# Patient Record
Sex: FEMALE | Race: WHITE | Employment: OTHER | URBAN - METROPOLITAN AREA
[De-identification: names, ages, dates, MRNs, and addresses within clinical notes are randomized per-mention and may not be internally consistent; named-entity substitution may affect disease eponyms.]

---

## 2019-09-11 ENCOUNTER — APPOINTMENT (OUTPATIENT)
Dept: CT IMAGING | Age: 84
DRG: 372 | End: 2019-09-11
Attending: EMERGENCY MEDICINE
Payer: MEDICARE

## 2019-09-11 ENCOUNTER — HOSPITAL ENCOUNTER (INPATIENT)
Age: 84
LOS: 2 days | Discharge: HOME | DRG: 372 | End: 2019-09-13
Attending: EMERGENCY MEDICINE | Admitting: HOSPITALIST
Payer: MEDICARE

## 2019-09-11 DIAGNOSIS — R10.2 SUPRAPUBIC ABDOMINAL PAIN: ICD-10-CM

## 2019-09-11 DIAGNOSIS — R10.31 ABDOMINAL PAIN, RIGHT LOWER QUADRANT: Primary | ICD-10-CM

## 2019-09-11 PROBLEM — I10 ESSENTIAL HYPERTENSION: Status: ACTIVE | Noted: 2019-09-11

## 2019-09-11 PROBLEM — Z91.09 ENVIRONMENTAL ALLERGIES: Status: ACTIVE | Noted: 2019-09-11

## 2019-09-11 PROBLEM — K35.80 ACUTE APPENDICITIS: Status: ACTIVE | Noted: 2019-09-11

## 2019-09-11 PROBLEM — E87.1 HYPONATREMIA: Status: ACTIVE | Noted: 2019-09-11

## 2019-09-11 LAB
ALBUMIN SERPL-MCNC: 3 G/DL (ref 3.4–5)
ALBUMIN/GLOB SERPL: 0.8 {RATIO} (ref 0.8–1.7)
ALP SERPL-CCNC: 111 U/L (ref 45–117)
ALT SERPL-CCNC: 21 U/L (ref 13–56)
ANION GAP SERPL CALC-SCNC: 9 MMOL/L (ref 3–18)
APPEARANCE UR: CLEAR
AST SERPL-CCNC: 12 U/L (ref 10–38)
ATRIAL RATE: 79 BPM
BACTERIA URNS QL MICRO: NEGATIVE /HPF
BASOPHILS # BLD: 0 K/UL (ref 0–0.1)
BASOPHILS NFR BLD: 0 % (ref 0–2)
BILIRUB SERPL-MCNC: 1 MG/DL (ref 0.2–1)
BILIRUB UR QL: NEGATIVE
BUN SERPL-MCNC: 8 MG/DL (ref 7–18)
BUN/CREAT SERPL: 10 (ref 12–20)
CALCIUM SERPL-MCNC: 8.7 MG/DL (ref 8.5–10.1)
CALCULATED R AXIS, ECG10: -18 DEGREES
CALCULATED T AXIS, ECG11: 66 DEGREES
CHLORIDE SERPL-SCNC: 93 MMOL/L (ref 100–111)
CK MB CFR SERPL CALC: NORMAL % (ref 0–4)
CK MB SERPL-MCNC: <1 NG/ML (ref 5–25)
CK SERPL-CCNC: 38 U/L (ref 26–192)
CO2 SERPL-SCNC: 25 MMOL/L (ref 21–32)
COLOR UR: ABNORMAL
CREAT SERPL-MCNC: 0.82 MG/DL (ref 0.6–1.3)
DIAGNOSIS, 93000: NORMAL
DIFFERENTIAL METHOD BLD: ABNORMAL
EOSINOPHIL # BLD: 0 K/UL (ref 0–0.4)
EOSINOPHIL NFR BLD: 0 % (ref 0–5)
EPITH CASTS URNS QL MICRO: NORMAL /LPF (ref 0–5)
ERYTHROCYTE [DISTWIDTH] IN BLOOD BY AUTOMATED COUNT: 13 % (ref 11.6–14.5)
GLOBULIN SER CALC-MCNC: 3.7 G/DL (ref 2–4)
GLUCOSE SERPL-MCNC: 109 MG/DL (ref 74–99)
GLUCOSE UR STRIP.AUTO-MCNC: NEGATIVE MG/DL
HCT VFR BLD AUTO: 31.8 % (ref 35–45)
HGB BLD-MCNC: 11.1 G/DL (ref 12–16)
HGB UR QL STRIP: NEGATIVE
KETONES UR QL STRIP.AUTO: ABNORMAL MG/DL
LEUKOCYTE ESTERASE UR QL STRIP.AUTO: NEGATIVE
LIPASE SERPL-CCNC: 74 U/L (ref 73–393)
LYMPHOCYTES # BLD: 0.8 K/UL (ref 0.9–3.6)
LYMPHOCYTES NFR BLD: 10 % (ref 21–52)
MCH RBC QN AUTO: 31.9 PG (ref 24–34)
MCHC RBC AUTO-ENTMCNC: 34.9 G/DL (ref 31–37)
MCV RBC AUTO: 91.4 FL (ref 74–97)
MONOCYTES # BLD: 0.7 K/UL (ref 0.05–1.2)
MONOCYTES NFR BLD: 8 % (ref 3–10)
NEUTS SEG # BLD: 7.2 K/UL (ref 1.8–8)
NEUTS SEG NFR BLD: 82 % (ref 40–73)
NITRITE UR QL STRIP.AUTO: NEGATIVE
P-R INTERVAL, ECG05: 312 MS
PH UR STRIP: 6 [PH] (ref 5–8)
PLATELET # BLD AUTO: 192 K/UL (ref 135–420)
PMV BLD AUTO: 10.2 FL (ref 9.2–11.8)
POTASSIUM SERPL-SCNC: 3.6 MMOL/L (ref 3.5–5.5)
PROT SERPL-MCNC: 6.7 G/DL (ref 6.4–8.2)
PROT UR STRIP-MCNC: ABNORMAL MG/DL
Q-T INTERVAL, ECG07: 438 MS
QRS DURATION, ECG06: 170 MS
QTC CALCULATION (BEZET), ECG08: 502 MS
RBC # BLD AUTO: 3.48 M/UL (ref 4.2–5.3)
RBC #/AREA URNS HPF: 0 /HPF (ref 0–5)
SODIUM SERPL-SCNC: 127 MMOL/L (ref 136–145)
SP GR UR REFRACTOMETRY: 1.01 (ref 1–1.03)
TROPONIN I SERPL-MCNC: <0.02 NG/ML (ref 0–0.04)
UROBILINOGEN UR QL STRIP.AUTO: 2 EU/DL (ref 0.2–1)
VENTRICULAR RATE, ECG03: 79 BPM
WBC # BLD AUTO: 8.8 K/UL (ref 4.6–13.2)
WBC URNS QL MICRO: NORMAL /HPF (ref 0–4)

## 2019-09-11 PROCEDURE — 80053 COMPREHEN METABOLIC PANEL: CPT

## 2019-09-11 PROCEDURE — 83690 ASSAY OF LIPASE: CPT

## 2019-09-11 PROCEDURE — 81001 URINALYSIS AUTO W/SCOPE: CPT

## 2019-09-11 PROCEDURE — 99285 EMERGENCY DEPT VISIT HI MDM: CPT

## 2019-09-11 PROCEDURE — 93005 ELECTROCARDIOGRAM TRACING: CPT

## 2019-09-11 PROCEDURE — 85025 COMPLETE CBC W/AUTO DIFF WBC: CPT

## 2019-09-11 PROCEDURE — 82550 ASSAY OF CK (CPK): CPT

## 2019-09-11 PROCEDURE — 74176 CT ABD & PELVIS W/O CONTRAST: CPT

## 2019-09-11 PROCEDURE — 74011250636 HC RX REV CODE- 250/636: Performed by: HOSPITALIST

## 2019-09-11 PROCEDURE — 74011000258 HC RX REV CODE- 258: Performed by: HOSPITALIST

## 2019-09-11 PROCEDURE — 65270000029 HC RM PRIVATE

## 2019-09-11 RX ORDER — ACETAMINOPHEN 325 MG/1
650 TABLET ORAL
Status: DISCONTINUED | OUTPATIENT
Start: 2019-09-11 | End: 2019-09-13 | Stop reason: HOSPADM

## 2019-09-11 RX ORDER — SODIUM CHLORIDE 9 MG/ML
100 INJECTION, SOLUTION INTRAVENOUS CONTINUOUS
Status: DISCONTINUED | OUTPATIENT
Start: 2019-09-11 | End: 2019-09-13 | Stop reason: HOSPADM

## 2019-09-11 RX ORDER — HEPARIN SODIUM 5000 [USP'U]/ML
5000 INJECTION, SOLUTION INTRAVENOUS; SUBCUTANEOUS EVERY 8 HOURS
Status: DISCONTINUED | OUTPATIENT
Start: 2019-09-11 | End: 2019-09-13 | Stop reason: HOSPADM

## 2019-09-11 RX ORDER — MORPHINE SULFATE 2 MG/ML
2 INJECTION, SOLUTION INTRAMUSCULAR; INTRAVENOUS
Status: DISCONTINUED | OUTPATIENT
Start: 2019-09-11 | End: 2019-09-13 | Stop reason: HOSPADM

## 2019-09-11 RX ORDER — ONDANSETRON 2 MG/ML
4 INJECTION INTRAMUSCULAR; INTRAVENOUS
Status: DISCONTINUED | OUTPATIENT
Start: 2019-09-11 | End: 2019-09-13 | Stop reason: HOSPADM

## 2019-09-11 RX ORDER — CLINDAMYCIN PHOSPHATE 900 MG/50ML
900 INJECTION INTRAVENOUS EVERY 8 HOURS
Status: DISCONTINUED | OUTPATIENT
Start: 2019-09-11 | End: 2019-09-13 | Stop reason: HOSPADM

## 2019-09-11 RX ORDER — AMLODIPINE BESYLATE 2.5 MG/1
2.5 TABLET ORAL DAILY
COMMUNITY

## 2019-09-11 RX ADMIN — AZTREONAM 2 G: 2 INJECTION, POWDER, LYOPHILIZED, FOR SOLUTION INTRAMUSCULAR; INTRAVENOUS at 17:49

## 2019-09-11 RX ADMIN — SODIUM CHLORIDE 100 ML/HR: 900 INJECTION, SOLUTION INTRAVENOUS at 19:53

## 2019-09-11 RX ADMIN — HEPARIN SODIUM 5000 UNITS: 5000 INJECTION INTRAVENOUS; SUBCUTANEOUS at 21:11

## 2019-09-11 RX ADMIN — CLINDAMYCIN PHOSPHATE 900 MG: 900 INJECTION, SOLUTION INTRAVENOUS at 18:56

## 2019-09-11 RX ADMIN — AZTREONAM 2 G: 2 INJECTION, POWDER, LYOPHILIZED, FOR SOLUTION INTRAMUSCULAR; INTRAVENOUS at 22:26

## 2019-09-11 NOTE — ED TRIAGE NOTES
Alert female arrives to ED c/o ruq pain onset Sunday, pt reports some relief after BM, constant, cramping. 6/10 pain currently. +nausea no vomiting. Pt seen at [address] yesterday, had blood work done, no imaging.

## 2019-09-11 NOTE — ED NOTES
Patient presents to ED with C/O abd pain and dysuria that started Sunday. The pt stated she went to [facility] ED yesterday, but did not wait for the results due to a long wait. The pt denies chest pain, SOB, N/V/D, dizziness, and fever/chills. Patient is A&Ox4, side rails upx1, call bell w/in reach, and aware of plan of care. The patient is in NAD.  at the bedside.

## 2019-09-11 NOTE — ED NOTES
Patient resting comfortably, side rails up, call bell w/in reach, no further needs expressed at this time, aware of POC.  at the bedside.

## 2019-09-11 NOTE — ED NOTES
TRANSFER - OUT REPORT:    Verbal report given to CANDIDO Guo(name) on [facility]  being transferred to (unit) for routine progression of care       Report consisted of patients Situation, Background, Assessment and   Recommendations(SBAR). Information from the following report(s) SBAR, Kardex, ED Summary, Intake/Output, MAR and Recent Results was reviewed with the receiving nurse. Lines:   Peripheral IV 09/11/19 Right Antecubital (Active)   Site Assessment Clean, dry, & intact 9/11/2019  3:07 PM   Phlebitis Assessment 0 9/11/2019  3:07 PM   Infiltration Assessment 0 9/11/2019  3:07 PM   Dressing Type Tape;Transparent 9/11/2019  3:07 PM   Hub Color/Line Status Pink;Flushed 9/11/2019  3:07 PM   Action Taken Dressing reinforced;Blood drawn 9/11/2019  3:07 PM        Opportunity for questions and clarification was provided.       Patient transported with:   [employer]

## 2019-09-11 NOTE — ED NOTES
I performed a brief evaluation, including history and physical, of the patient here in triage and I have determined that pt will need further treatment and evaluation from the main side ER physician. I have placed initial orders to help in expediting patients care.      September 11, 2019 at 1:53 PM - Flores Bruno PA-C        Visit Vitals  /66   Pulse 90   Temp 98.3 °F (36.8 °C)   Resp 16   Ht 5' 3\" (1.6 m)   Wt 55.3 kg (122 lb)   SpO2 98%   BMI 21.61 kg/m²

## 2019-09-11 NOTE — H&P
History and Physical    Patient: Vidhi Bernstein               Sex: female          DOA: 9/11/2019       YOB: 1935      Age:  81 y.o.        LOS:  LOS: 0 days        CHIEF COMPLAINT: Abdominal Pain    Assessment/Plan:     Principal Problem:    RLQ abdominal pain (9/11/2019)    Active Problems:    Hyponatremia (9/11/2019)      Essential hypertension (9/11/2019)      Environmental allergies (9/11/2019)      Acute appendicitis (9/11/2019)      PLAN:  · RLQ abd pain, possible appendicitis per CT  · Dr. Matthew Gottlieb in Surgery consulted in ED, he will follow pt  · Start empiric IV abx to target bowel mireille, aztreonam and clindamycin (due to allergies)  · IV fluids  · NPO in case pt needs surgery  · Pain meds and anti-emetics prn  · HTN  · BP control  · Hyponatremia  · Suspect due to decreased PO intake over last 3-4 days  · IV fluids  · Monitor Na  · DVT prophylaxis    HPI:     Vidhi Bernstein is a 81 y.o. female who presented with abdominal pain. Pt said she began to experience RLQ crampy abdominal pain since 9/8. She thought she was constipated, and took prune juice and colace but pain did not improve. She went to [facility] ED yesterday but could not stand the wait so she went home. She presented today due to persistent pain. She has had decreased PO intake as a result of pain. She denies nausea or vomiting. She said she did have some diarrhea. She denies fevers or chills. She presented to the ED. Labs notable for hyponatremia. CT a/p shows finds concerning for acute appendicitis. Rosal[place]in [place] Dr. Matthew Gottlieb was consulted by ED who apparently did not think patient needed surgery and recommended medical admission. Pt was admitted and started on IV abx. Currently c/o 5/10 RLQ abd pain.       Past Medical History:   Diagnosis Date    CAD (coronary artery disease)     Pacemaker        Social History:  Social History     Socioeconomic History    Marital status: SINGLE     Spouse name: Not on file    Number of children: Not on file    Years of education: Not on file    Highest education level: Not on file   Tobacco Use    Smoking status: Never Smoker    Smokeless tobacco: Never Used   Substance and Sexual Activity    Alcohol use: Yes     Frequency: Never     Comment: occ    Drug use: Yes     Types: Marijuana       Family History:  History reviewed. No pertinent family history. Allergies: Allergies   Allergen Reactions    Pcn [Penicillins] Anaphylaxis    Levaquin [Levofloxacin] Unknown (comments)     \"arthritic pain'    Sulfa (Sulfonamide Antibiotics) Unknown (comments)       Home Medications:  Prior to Admission medications    Medication Sig Start Date End Date Taking? Authorizing Provider   amLODIPine (NORVASC) 2.5 mg tablet Take 2.5 mg by mouth daily. Yes Other, MD Falguni   montelukast sodium (SINGULAIR PO) Take  by mouth. Yes Other, MD Falguni         Review of Systems  Review of Systems   Constitutional: Negative for chills and fever. HENT: Negative for congestion and hearing loss. Eyes: Negative for blurred vision and double vision. Respiratory: Negative for cough and shortness of breath. Cardiovascular: Negative for chest pain and palpitations. Gastrointestinal: Positive for abdominal pain, constipation and diarrhea. Negative for nausea and vomiting. Genitourinary: Negative for dysuria and hematuria. Musculoskeletal: Negative for falls and myalgias. Skin: Negative for rash. Neurological: Negative for dizziness and focal weakness. Psychiatric/Behavioral: Negative. Objective:      Visit Vitals  /71   Pulse 89   Temp 98.3 °F (36.8 °C)   Resp 19   Ht 5' 3\" (1.6 m)   Wt 55.3 kg (122 lb)   SpO2 97%   BMI 21.61 kg/m²       Physical Exam:  Ears: hearing is intact  Eyes: Icterus is not present  Lungs: clear to auscultation bilaterally  Heart: regular rate and rhythm, S1, S2 normal, no murmur, click, rub or gallop  Gastrointestinal: soft, tenderness in RLQ.  No rebound or guarding. Bowel sounds normal. No masses,  no organomegaly  Neurological:  New Focal Deficits are not present  Psychiatric:  Mood is stable    Laboratory Studies:  CMP:   Lab Results   Component Value Date/Time     (L) 09/11/2019 03:00 PM    K 3.6 09/11/2019 03:00 PM    CL 93 (L) 09/11/2019 03:00 PM    CO2 25 09/11/2019 03:00 PM    AGAP 9 09/11/2019 03:00 PM     (H) 09/11/2019 03:00 PM    BUN 8 09/11/2019 03:00 PM    CREA 0.82 09/11/2019 03:00 PM    GFRAA >60 09/11/2019 03:00 PM    GFRNA >60 09/11/2019 03:00 PM    CA 8.7 09/11/2019 03:00 PM    ALB 3.0 (L) 09/11/2019 03:00 PM    TP 6.7 09/11/2019 03:00 PM    GLOB 3.7 09/11/2019 03:00 PM    AGRAT 0.8 09/11/2019 03:00 PM    SGOT 12 09/11/2019 03:00 PM    ALT 21 09/11/2019 03:00 PM     CBC:   Lab Results   Component Value Date/Time    WBC 8.8 09/11/2019 03:00 PM    HGB 11.1 (L) 09/11/2019 03:00 PM    HCT 31.8 (L) 09/11/2019 03:00 PM     09/11/2019 03:00 PM       Imaging:  CT ABD PELV WO CONT   Final Result   IMPRESSION:      1. Examination limited secondary to lack of oral and intravenous contrast.      2. Moderate to marked inflammatory changes in the pericecal region as well as   within the lower pelvic mesentery. There appears to be a dilated tubular   structure in the pericecal region thought to represent a dilated appendix,   concerning for acute appendicitis. Adjacent to the presumed dilated appendix   there is a rounded structure with central calcification, thought to represent   the right ovary, less likely a periappendiceal abscess. No extraluminal air. Additional wall thickening of the distal ileum (with sparing of the terminal   ileum) thought to be secondarily involved. 3. Moderate colonic diverticulosis without acute diverticulitis.

## 2019-09-11 NOTE — ED PROVIDER NOTES
[facility] EMERGENCY DEPT      3:07 PM    Date: 9/11/2019  Patient Name: Teresa Chandler    History of Presenting Illness     Chief Complaint   Patient presents with    Abdominal Pain       81 y.o. female with noted past medical history who presents to the emergency department with abdominal pain. The patient states that 3 days ago started noted that she was constipated for a few days and took her usual prune juice. Since then she has had some large bowel movements for the first day but has had a decrease since then secondary to not eating as much. She states 3 days ago she also started to have some right lower quadrant abdominal pain that persisted the present. She denies any UTI symptoms to me but states that she does have pain with urination her abdomen. She does deny any dysuria urgency or frequency. She denies fever chills. She states she had prior abdominal surgeries including a bladder tuck, hysterectomy, and cholecystectomy. She believes she also had an appendectomy as well but is unsure. Patient denies any other associated signs or symptoms. Patient denies any other complaints. Nursing notes regarding the HPI and triage nursing notes were reviewed. Prior medical records were reviewed. Past History     Past Medical History:  Past Medical History:   Diagnosis Date    CAD (coronary artery disease)     Pacemaker        Past Surgical History:  Past Surgical History:   Procedure Laterality Date    HX BLADDER SUSPENSION      HX CHOLECYSTECTOMY      HX HYSTERECTOMY      HX ORTHOPAEDIC         Family History:  History reviewed. No pertinent family history. Social History:  Social History     Tobacco Use    Smoking status: Never Smoker    Smokeless tobacco: Never Used   Substance Use Topics    Alcohol use: Yes     Frequency: Never     Comment: occ    Drug use: Yes     Types: Marijuana       Allergies:   Allergies   Allergen Reactions    Pcn [Penicillins] Anaphylaxis    Levaquin [Levofloxacin] Unknown (comments)     \"arthritic pain'    Sulfa (Sulfonamide Antibiotics) Unknown (comments)       Patient's primary care provider (as noted in EPIC):  Di Almodovar MD    Review of Systems   Constitutional: Negative for diaphoresis. HENT: Negative for congestion. Eyes: Negative for discharge. Respiratory: Negative for stridor. Cardiovascular: Negative for palpitations. Gastrointestinal: Negative for diarrhea. Endocrine: Negative for heat intolerance. Genitourinary: Negative for flank pain. Musculoskeletal: Negative for back pain. Neurological: Negative for weakness. Psychiatric/Behavioral: Negative for hallucinations. All other systems reviewed and are negative. Visit Vitals  /61   Pulse 73   Temp 98.3 °F (36.8 °C)   Resp 16   Ht 5' 3\" (1.6 m)   Wt 55.3 kg (122 lb)   SpO2 96%   BMI 21.61 kg/m²       Patient Vitals for the past 12 hrs:   Temp Pulse Resp BP SpO2   09/11/19 1615  73 16 132/61 96 %   09/11/19 1545  76 14 144/71 97 %   09/11/19 1507  83 12  97 %   09/11/19 1506    147/68    09/11/19 1352 98.3 °F (36.8 °C) 90 16 138/66 98 %       PHYSICAL EXAM:    CONSTITUTIONAL:  Alert, in no apparent distress;  well developed;  well nourished. HEAD:  Normocephalic, atraumatic. EYES:  EOMI. Non-icteric sclera. Normal conjunctiva. ENTM:  Nose:  no rhinorrhea. Throat:  no erythema or exudate, mucous membranes moist.  NECK:  No JVD. Supple  RESPIRATORY:  Chest clear, equal breath sounds, good air movement. CARDIOVASCULAR:  Regular rate and rhythm. No murmurs, rubs, or gallops. GI:  Normal bowel sounds, abdomen soft with right lower quadrant and lesser suprapubic tenderness to palpation. No rebound or guarding. BACK:  Non-tender. UPPER EXT:  Normal inspection. LOWER EXT:  No edema, no calf tenderness. Distal pulses intact.   NEURO:  Moves all four extremities, and grossly normal motor exam.  SKIN:  No rashes;  Normal for age. PSYCH:  Alert and normal affect. DIFFERENTIAL DIAGNOSES/ MEDICAL DECISION MAKING:  Gastritis, gerd, peptic ulcer disease, cholecystitis, pancreatitis, gastroenteritis, hepatitis, constipation related pain, appendicitis pain, diverticulitis, urinary tract infection, obstruction, abdominal wall pain, or combination of the above versus many other processes. Diagnostic Study Results     Abnormal lab results from this emergency department encounter:  Labs Reviewed   CBC WITH AUTOMATED DIFF - Abnormal; Notable for the following components:       Result Value    RBC 3.48 (*)     HGB 11.1 (*)     HCT 31.8 (*)     NEUTROPHILS 82 (*)     LYMPHOCYTES 10 (*)     ABS.  LYMPHOCYTES 0.8 (*)     All other components within normal limits   METABOLIC PANEL, COMPREHENSIVE - Abnormal; Notable for the following components:    Sodium 127 (*)     Chloride 93 (*)     Glucose 109 (*)     BUN/Creatinine ratio 10 (*)     Albumin 3.0 (*)     All other components within normal limits   URINALYSIS W/ RFLX MICROSCOPIC - Abnormal; Notable for the following components:    Protein TRACE (*)     Ketone TRACE (*)     Urobilinogen 2.0 (*)     All other components within normal limits   LIPASE   CARDIAC PANEL,(CK, CKMB & TROPONIN)   URINE MICROSCOPIC ONLY       Lab values for this patient within approximately the last 12 hours:  Recent Results (from the past 12 hour(s))   EKG, 12 LEAD, INITIAL    Collection Time: 09/11/19  2:00 PM   Result Value Ref Range    Ventricular Rate 79 BPM    Atrial Rate 79 BPM    P-R Interval 312 ms    QRS Duration 170 ms    Q-T Interval 438 ms    QTC Calculation (Bezet) 502 ms    Calculated R Axis -18 degrees    Calculated T Axis 66 degrees    Diagnosis       Electronic ventricular pacemaker  When compared with ECG of 14-MAY-2012 13:15,  Electronic ventricular pacemaker has replaced Sinus rhythm  Confirmed by Josh Gutiérrez ([id number]) on 9/11/2019 3:19:28 PM     CBC WITH AUTOMATED DIFF    Collection Time: 09/11/19 3:00 PM   Result Value Ref Range    WBC 8.8 4.6 - 13.2 K/uL    RBC 3.48 (L) 4.20 - 5.30 M/uL    HGB 11.1 (L) 12.0 - 16.0 g/dL    HCT 31.8 (L) 35.0 - 45.0 %    MCV 91.4 74.0 - 97.0 FL    MCH 31.9 24.0 - 34.0 PG    MCHC 34.9 31.0 - 37.0 g/dL    RDW 13.0 11.6 - 14.5 %    PLATELET 362 026 - 145 K/uL    MPV 10.2 9.2 - 11.8 FL    NEUTROPHILS 82 (H) 40 - 73 %    LYMPHOCYTES 10 (L) 21 - 52 %    MONOCYTES 8 3 - 10 %    EOSINOPHILS 0 0 - 5 %    BASOPHILS 0 0 - 2 %    ABS. NEUTROPHILS 7.2 1.8 - 8.0 K/UL    ABS. LYMPHOCYTES 0.8 (L) 0.9 - 3.6 K/UL    ABS. MONOCYTES 0.7 0.05 - 1.2 K/UL    ABS. EOSINOPHILS 0.0 0.0 - 0.4 K/UL    ABS. BASOPHILS 0.0 0.0 - 0.1 K/UL    DF AUTOMATED     LIPASE    Collection Time: 09/11/19  3:00 PM   Result Value Ref Range    Lipase 74 73 - 393 U/L   CARDIAC PANEL,(CK, CKMB & TROPONIN)    Collection Time: 09/11/19  3:00 PM   Result Value Ref Range    CK 38 26 - 192 U/L    CK - MB <1.0 <3.6 ng/ml    CK-MB Index  0.0 - 4.0 %     CALCULATION NOT PERFORMED WHEN RESULT IS BELOW LINEAR LIMIT    Troponin-I, QT <0.02 0.0 - 5.878 NG/ML   METABOLIC PANEL, COMPREHENSIVE    Collection Time: 09/11/19  3:00 PM   Result Value Ref Range    Sodium 127 (L) 136 - 145 mmol/L    Potassium 3.6 3.5 - 5.5 mmol/L    Chloride 93 (L) 100 - 111 mmol/L    CO2 25 21 - 32 mmol/L    Anion gap 9 3.0 - 18 mmol/L    Glucose 109 (H) 74 - 99 mg/dL    BUN 8 7.0 - 18 MG/DL    Creatinine 0.82 0.6 - 1.3 MG/DL    BUN/Creatinine ratio 10 (L) 12 - 20      GFR est AA >60 >60 ml/min/1.73m2    GFR est non-AA >60 >60 ml/min/1.73m2    Calcium 8.7 8.5 - 10.1 MG/DL    Bilirubin, total 1.0 0.2 - 1.0 MG/DL    ALT (SGPT) 21 13 - 56 U/L    AST (SGOT) 12 10 - 38 U/L    Alk.  phosphatase 111 45 - 117 U/L    Protein, total 6.7 6.4 - 8.2 g/dL    Albumin 3.0 (L) 3.4 - 5.0 g/dL    Globulin 3.7 2.0 - 4.0 g/dL    A-G Ratio 0.8 0.8 - 1.7     URINALYSIS W/ RFLX MICROSCOPIC    Collection Time: 09/11/19  3:29 PM   Result Value Ref Range    Color DARK YELLOW Appearance CLEAR      Specific gravity 1.013 1.005 - 1.030      pH (UA) 6.0 5.0 - 8.0      Protein TRACE (A) NEG mg/dL    Glucose NEGATIVE  NEG mg/dL    Ketone TRACE (A) NEG mg/dL    Bilirubin NEGATIVE  NEG      Blood NEGATIVE  NEG      Urobilinogen 2.0 (H) 0.2 - 1.0 EU/dL    Nitrites NEGATIVE  NEG      Leukocyte Esterase NEGATIVE  NEG     URINE MICROSCOPIC ONLY    Collection Time: 09/11/19  3:29 PM   Result Value Ref Range    WBC 0 to 2 0 - 4 /hpf    RBC 0 0 - 5 /hpf    Epithelial cells FEW 0 - 5 /lpf    Bacteria NEGATIVE  NEG /hpf       Radiologist and cardiologist interpretations if available at time of this note:  Ct Abd Pelv Wo Cont    Result Date: 9/11/2019  EXAM: CT of the abdomen and pelvis CLINICAL INDICATION/HISTORY: Abdominal pain, RLQ   > Additional: None. COMPARISON: None. > Reference Exam: None. TECHNIQUE: Axial CT imaging of the abdomen and pelvis was performed without intravenous contrast. Oral contrast not administered. Multiplanar reformats were generated. Dose reduction techniques used: automated exposure control, adjustment of the mAs and/or kVp according to patient size, and iterative reconstruction techniques. Digital Imaging and Communications in Medicine (DICOM) format image data are available to nonaffiliated external healthcare facilities or entities on a secure, media free, reciprocally searchable basis with patient authorization for at least a 12-month period after this study. _______________ FINDINGS: LOWER CHEST: Bibasilar opacities, atelectasis or scarring. No significant pleural effusion. Heart is mildly enlarged. LIVER, BILIARY: Liver is unremarkable. Mild intra and extra hepatic biliary ductal dilatation, likely reservoir effect. Cholecystectomy. PANCREAS: Unremarkable. SPLEEN: Unremarkable. ADRENALS: Mildly diffusely prominent adrenal glands, may represent hyperplasia. KIDNEYS/URETERS/BLADDER: No hydronephrosis. No calculi. LYMPH NODES: No enlarged lymph nodes. GASTROINTESTINAL TRACT: Moderate pericecal inflammatory changes with dilated tubular structure (measuring 11 mm in diameter thought to represent a dilated appendix. There is adjacent wall thickening involving the distal ileum with sparing of the terminal ileum, thought to be secondarily involved. Adjacent to the tip of the presumed dilated appendix there is a rounded structure (image 117) measuring 2.6 cm with central calcification. The right ovarian vein appears to extend inferiorly to this rounded structure. No extraluminal air. There is additional moderate to marked inflammatory changes within the lower mesentery. Moderate colonic diverticulosis without acute diverticulitis. PELVIC ORGANS: Hysterectomy. VASCULATURE: Mild atherosclerosis. BONES: No acute or aggressive osseous abnormalities identified. OTHER: No ascites. _______________     IMPRESSION: 1. Examination limited secondary to lack of oral and intravenous contrast. 2. Moderate to marked inflammatory changes in the pericecal region as well as within the lower pelvic mesentery. There appears to be a dilated tubular structure in the pericecal region thought to represent a dilated appendix, concerning for acute appendicitis. Adjacent to the presumed dilated appendix there is a rounded structure with central calcification, thought to represent the right ovary, less likely a periappendiceal abscess. No extraluminal air. Additional wall thickening of the distal ileum (with sparing of the terminal ileum) thought to be secondarily involved. 3. Moderate colonic diverticulosis without acute diverticulitis. Medication(s) ordered for patient during this emergency visit encounter:  Medications - No data to display    Medical Decision Making     I am the first provider for this patient. I reviewed the vital signs, available nursing notes, past medical history, past surgical history, family history and social history.     Vital Signs:  Reviewed the patient's vital signs. ED COURSE AND MEDICAL DECISION MAKING:    The patient did not want any medication for pain while in emergency department. On reassessment of the patient, the patient continues to have no surgical abdomen with no rebound nor guarding. The patient does not appear septic by presentation, vital signs and laboratory results. The patient continues to appear non-toxic in the emergency department on reevaluations. Consult Surgeon on Call    The on call surgery group/individual was called an with primarily right lower quadrant abdominal pain and she does have a rebound on my exam regarding this quadrant d the patient was presented for surgery consult. I personally spoke with Dr. Aarti Peng, in the surgery group, about the patient's presentation and management. I subsequently placed the noted surgeon on the treatment team.    5:16 PM  I spoke to Dr. Aarti Peng, the on-call surgeon, about the hospitalist needing a consult in the chart regarding medical management with antibiotics and that the patient does not need to or will not go to surgery at this time. Admit to Hospitalist    The patient was presented to the accepting hospitalist, Dr. Phyllis Perez. The patient's primary doctor is Candance Lander, MD, and admissions for this physician are with the hospitalist.  If the patient has no primary doctor, then admission is to the hospitalist as well. As the emergency physician, I wrote courtesy admission orders for the hospitalist physician. The courtesy orders included explicit instructions for the floor nursing staff to call the admitting attending physician upon patient arrival on the floor. IMPRESSION AND MEDICAL DECISION MAKING:  Based upon the patient's presentation with noted HPI and PE, along with the work   up done in the emergency department, I believe that the patient is having abdominal pain of uncertain etiology.       However, I do believe that the patient is stable and can be discharged home with further outpatient evaluation of the abdominal pain by the patient's primary doctor. 1. Abdominal pain. SPECIFIC PATIENT INSTRUCTIONS FROM THE PHYSICIAN WHO TREATED YOU IN THE ER TODAY:  1. Return if any concerns or worsening of condition(s)  2. IF prescribed, Norco for pain not controlled with over the counter ibuprofen. 3. Follow up with your primary doctor in the next 2-4 days for reevaluation. Patient is improved, resting quietly and comfortably. The patient will be discharged home. The patient was reassured that these symptoms do not appear to represent a serious or life threatening condition at this time. Warning signs of worsening condition were discussed and understood by the patient. Based on patient's age, coexisting illness, exam, and the results of this ED evaluation, the decision to treat as an outpatient was made. Based on the information available at time of discharge, acute pathology requiring immediate intervention was deemed relative unlikely. While it is impossible to completely exclude the possibility of underlying serious disease or worsening of condition, I feel the relative likelihood is extremely low. I discussed this uncertainty with the patient, who understood ED evaluation and treatment and felt comfortable with the outpatient treatment plan. All questions regarding care, test results, and follow up were answered. The patient is stable and appropriate to discharge. They understand that they should return to the emergency department for any new or worsening symptoms. I stressed the importance of follow up for repeat assessment and possibly further evaluation/treatment. Dictation disclaimer:  Please note that this dictation was completed with [employer], the [employer] voice recognition software.   Quite often unanticipated grammatical, syntax, homophones, and other interpretive errors are inadvertently transcribed by the computer software. Please disregard these errors. Please excuse any errors that have escaped final proofreading. Coding Diagnoses     Clinical Impression:   1. Abdominal pain, right lower quadrant    2. Suprapubic abdominal pain        Disposition     Disposition:  Admit. ANETTE Marcelino Chi Board Certified Emergency Physician    Provider Attestation:  If a scribe was utilized in generation of this patient record, I personally performed the services described in the documentation, reviewed the documentation, as recorded by the scribe in my presence, and it accurately records the patient's history of presenting illness, review of systems, patient physical examination, and procedures performed by me as the attending physician. ANETTE Marcelino Chi Board Certified Emergency Physician  9/11/2019.  4:22 PM

## 2019-09-12 LAB
ANION GAP SERPL CALC-SCNC: 10 MMOL/L (ref 3–18)
BASOPHILS # BLD: 0 K/UL (ref 0–0.1)
BASOPHILS NFR BLD: 0 % (ref 0–2)
BUN SERPL-MCNC: 7 MG/DL (ref 7–18)
BUN/CREAT SERPL: 11 (ref 12–20)
CALCIUM SERPL-MCNC: 8 MG/DL (ref 8.5–10.1)
CHLORIDE SERPL-SCNC: 99 MMOL/L (ref 100–111)
CO2 SERPL-SCNC: 22 MMOL/L (ref 21–32)
CREAT SERPL-MCNC: 0.65 MG/DL (ref 0.6–1.3)
DIFFERENTIAL METHOD BLD: ABNORMAL
EOSINOPHIL # BLD: 0.1 K/UL (ref 0–0.4)
EOSINOPHIL NFR BLD: 1 % (ref 0–5)
ERYTHROCYTE [DISTWIDTH] IN BLOOD BY AUTOMATED COUNT: 13.1 % (ref 11.6–14.5)
GLUCOSE SERPL-MCNC: 101 MG/DL (ref 74–99)
HCT VFR BLD AUTO: 28.8 % (ref 35–45)
HGB BLD-MCNC: 9.6 G/DL (ref 12–16)
LYMPHOCYTES # BLD: 0.9 K/UL (ref 0.9–3.6)
LYMPHOCYTES NFR BLD: 14 % (ref 21–52)
MAGNESIUM SERPL-MCNC: 1.8 MG/DL (ref 1.6–2.6)
MCH RBC QN AUTO: 31 PG (ref 24–34)
MCHC RBC AUTO-ENTMCNC: 33.3 G/DL (ref 31–37)
MCV RBC AUTO: 92.9 FL (ref 74–97)
MONOCYTES # BLD: 0.7 K/UL (ref 0.05–1.2)
MONOCYTES NFR BLD: 10 % (ref 3–10)
NEUTS SEG # BLD: 4.9 K/UL (ref 1.8–8)
NEUTS SEG NFR BLD: 75 % (ref 40–73)
PHOSPHATE SERPL-MCNC: 3.7 MG/DL (ref 2.5–4.9)
PLATELET # BLD AUTO: 186 K/UL (ref 135–420)
PMV BLD AUTO: 10 FL (ref 9.2–11.8)
POTASSIUM SERPL-SCNC: 3.4 MMOL/L (ref 3.5–5.5)
RBC # BLD AUTO: 3.1 M/UL (ref 4.2–5.3)
SODIUM SERPL-SCNC: 131 MMOL/L (ref 136–145)
WBC # BLD AUTO: 6.6 K/UL (ref 4.6–13.2)

## 2019-09-12 PROCEDURE — 83735 ASSAY OF MAGNESIUM: CPT

## 2019-09-12 PROCEDURE — 84100 ASSAY OF PHOSPHORUS: CPT

## 2019-09-12 PROCEDURE — 74011250636 HC RX REV CODE- 250/636: Performed by: HOSPITALIST

## 2019-09-12 PROCEDURE — 85025 COMPLETE CBC W/AUTO DIFF WBC: CPT

## 2019-09-12 PROCEDURE — 74011000258 HC RX REV CODE- 258: Performed by: HOSPITALIST

## 2019-09-12 PROCEDURE — 74011250637 HC RX REV CODE- 250/637: Performed by: HOSPITALIST

## 2019-09-12 PROCEDURE — 80048 BASIC METABOLIC PNL TOTAL CA: CPT

## 2019-09-12 PROCEDURE — 36415 COLL VENOUS BLD VENIPUNCTURE: CPT

## 2019-09-12 PROCEDURE — 65270000029 HC RM PRIVATE

## 2019-09-12 RX ORDER — NALOXONE HYDROCHLORIDE 0.4 MG/ML
0.4 INJECTION, SOLUTION INTRAMUSCULAR; INTRAVENOUS; SUBCUTANEOUS AS NEEDED
Status: DISCONTINUED | OUTPATIENT
Start: 2019-09-12 | End: 2019-09-13 | Stop reason: HOSPADM

## 2019-09-12 RX ORDER — POTASSIUM CHLORIDE 750 MG/1
20 TABLET, EXTENDED RELEASE ORAL
Status: DISCONTINUED | OUTPATIENT
Start: 2019-09-12 | End: 2019-09-12 | Stop reason: ALTCHOICE

## 2019-09-12 RX ORDER — POTASSIUM CHLORIDE 7.45 MG/ML
10 INJECTION INTRAVENOUS ONCE
Status: DISCONTINUED | OUTPATIENT
Start: 2019-09-12 | End: 2019-09-12 | Stop reason: ALTCHOICE

## 2019-09-12 RX ORDER — MAGNESIUM SULFATE 1 G/100ML
1 INJECTION INTRAVENOUS ONCE
Status: COMPLETED | OUTPATIENT
Start: 2019-09-12 | End: 2019-09-12

## 2019-09-12 RX ORDER — POTASSIUM CHLORIDE 7.45 MG/ML
10 INJECTION INTRAVENOUS
Status: DISPENSED | OUTPATIENT
Start: 2019-09-12 | End: 2019-09-12

## 2019-09-12 RX ORDER — POTASSIUM CHLORIDE 750 MG/1
20 TABLET, FILM COATED, EXTENDED RELEASE ORAL
Status: DISCONTINUED | OUTPATIENT
Start: 2019-09-12 | End: 2019-09-12

## 2019-09-12 RX ADMIN — SODIUM CHLORIDE 100 ML/HR: 900 INJECTION, SOLUTION INTRAVENOUS at 12:32

## 2019-09-12 RX ADMIN — AZTREONAM 2 G: 2 INJECTION, POWDER, LYOPHILIZED, FOR SOLUTION INTRAMUSCULAR; INTRAVENOUS at 22:34

## 2019-09-12 RX ADMIN — CLINDAMYCIN PHOSPHATE 900 MG: 900 INJECTION, SOLUTION INTRAVENOUS at 03:21

## 2019-09-12 RX ADMIN — AZTREONAM 2 G: 2 INJECTION, POWDER, LYOPHILIZED, FOR SOLUTION INTRAMUSCULAR; INTRAVENOUS at 06:04

## 2019-09-12 RX ADMIN — CLINDAMYCIN PHOSPHATE 900 MG: 900 INJECTION, SOLUTION INTRAVENOUS at 10:26

## 2019-09-12 RX ADMIN — CLINDAMYCIN PHOSPHATE 900 MG: 900 INJECTION, SOLUTION INTRAVENOUS at 16:41

## 2019-09-12 RX ADMIN — MAGNESIUM SULFATE HEPTAHYDRATE 1 G: 1 INJECTION, SOLUTION INTRAVENOUS at 10:27

## 2019-09-12 RX ADMIN — POTASSIUM CHLORIDE 20 MEQ: 750 TABLET, EXTENDED RELEASE ORAL at 12:34

## 2019-09-12 RX ADMIN — AZTREONAM 2 G: 2 INJECTION, POWDER, LYOPHILIZED, FOR SOLUTION INTRAMUSCULAR; INTRAVENOUS at 13:35

## 2019-09-12 RX ADMIN — POTASSIUM CHLORIDE 20 MEQ: 750 TABLET, EXTENDED RELEASE ORAL at 13:35

## 2019-09-12 RX ADMIN — POTASSIUM CHLORIDE 10 MEQ: 7.46 INJECTION, SOLUTION INTRAVENOUS at 09:00

## 2019-09-12 NOTE — PROGRESS NOTES
200 nurse modified pt diet to reflect per pt request no eggs, milk, or beef, no allergies just dislikes,,, ezio hutton

## 2019-09-12 NOTE — PROGRESS NOTES
Problem: Pain  Goal: *Control of Pain  Outcome: Progressing Towards Goal     Problem: Patient Education: Go to Patient Education Activity  Goal: Patient/Family Education  Outcome: Progressing Towards Goal     Problem: Pressure Injury - Risk of  Goal: *Prevention of pressure injury  Description  Document Emile Scale and appropriate interventions in the flowsheet. Outcome: Progressing Towards Goal  Note:   Pressure Injury Interventions: Activity Interventions: Increase time out of bed         Nutrition Interventions: Document food/fluid/supplement intake                     Problem: Patient Education: Go to Patient Education Activity  Goal: Patient/Family Education  Outcome: Progressing Towards Goal     Problem: Falls - Risk of  Goal: *Absence of Falls  Description  Document Franco Barrera Fall Risk and appropriate interventions in the flowsheet.   Outcome: Progressing Towards Goal  Note:   Fall Risk Interventions:            Medication Interventions: Teach patient to arise slowly                   Problem: Patient Education: Go to Patient Education Activity  Goal: Patient/Family Education  Outcome: Progressing Towards Goal

## 2019-09-12 NOTE — PROGRESS NOTES
Bedside shift change report given to Palomo Blackwell RN (oncoming nurse) by Amanda Foley (offgoing nurse). Report included the following information SBAR, Kardex, Intake/Output and MAR.

## 2019-09-12 NOTE — PROGRESS NOTES
conducted an initial consultation and Spiritual Assessment for Savi Estrella, who is a 81 y.o.,female. Patients Primary Language is: [place]. According to the patients EMR Scientologist Affiliation is: Yesenia Pettit  The reason the Patient came to the hospital is:   Patient Active Problem List    Diagnosis Date Noted    RLQ abdominal pain 09/11/2019    Hyponatremia 09/11/2019    Essential hypertension 09/11/2019    Environmental allergies 09/11/2019    Acute appendicitis 09/11/2019        The  provided the following Interventions:  Initiated a relationship of care and support with patient in room 2214 this morning. Listened empathically as patient talked about her being here and her being a [place] in belief. Patient related that she does not have an advance directive here on file nore has she ever done one but not interested in completing one today. Provided information about Spiritual Care Services. Offered prayer and assurance of continued prayers on patients behalf. The following outcomes were achieved:  Patient shared limited information about her medical narrative and spiritual journey/beliefs. Patient processed feeling about current hospitalization. Patient expressed gratitude for pastoral care visit. Assessment:  Patient does not have any Anglican/cultural needs that will affect patients preferences in health care. There are no further spiritual or Anglican issues which require Spiritual Care Services interventions at this time. Plan:  Chaplains will continue to follow and will provide pastoral care on an as needed/requested basis    . Fran Duke   Spiritual Care   [phone]

## 2019-09-12 NOTE — PROGRESS NOTES
Nutrition initial assessment/Plan of care      RECOMMENDATIONS:   1. Cardiac Diet (No dairy, eggs, pork or beef)  2. Monitor labs, weight and PO intake  3. RD to follow     GOALS:   1. PO intake meets >75% of protein/calorie needs by 9/17  2. Weight Maintenance (+/- 1-2 lb) by 9/19      ASSESSMENT:   Wt status is classified as normal per Body mass index is 21.61 kg/m². PO intake improving. Labs noted. Pt w/ hyponatremia and hypokalemia. Nutrition recommendations listed. RD to follow. Nutrition Diagnoses:   Inadequate PO intake related to decreased appetite d/t ABD pain as evidenced by recent diet recall from patient     Nutrition Risk:  [] High  [x] Moderate []  Low    SUBJECTIVE/OBJECTIVE:    Pt admitted for ABD pain. GS consulted and does not believe to be appendicitis; recommending GI consult, cont atbx and advance diet. Pt seen in room this afternoon. Denies having any food allergies (has intolerances to dairy, eggs, pork or beef) and no problems chewing/swallowing. Weight has been stable; recent  lb. Reports having a  good appetite normally consuming 3 meals per day at home. However since the ABD pain started her appetite has decreased. Provided a menu to implement preferences with dietary. Will continue to monitor. Information Obtained from:    [x] Chart Review   [x] Patient   [] Family/Caregiver   [] Nurse/Physician   [] Interdisciplinary Meeting/Rounds      Diet: Cardiac Diet  Medications: [x] Reviewed    Allergies: [x] Reviewed   Encounter Diagnoses     ICD-10-CM ICD-9-CM   1. Abdominal pain, right lower quadrant R10.31 789.03   2.  Suprapubic abdominal pain R10.2 789.09     Past Medical History:   Diagnosis Date    CAD (coronary artery disease)     Pacemaker       Labs:    Lab Results   Component Value Date/Time    Sodium 131 (L) 09/12/2019 04:00 AM    Potassium 3.4 (L) 09/12/2019 04:00 AM    Chloride 99 (L) 09/12/2019 04:00 AM    CO2 22 09/12/2019 04:00 AM    Anion gap 10 09/12/2019 04:00 AM    Glucose 101 (H) 09/12/2019 04:00 AM    BUN 7 09/12/2019 04:00 AM    Creatinine 0.65 09/12/2019 04:00 AM    Calcium 8.0 (L) 09/12/2019 04:00 AM    Magnesium 1.8 09/12/2019 04:00 AM    Phosphorus 3.7 09/12/2019 04:00 AM    Albumin 3.0 (L) 09/11/2019 03:00 PM     Anthropometrics: BMI (calculated): 21.6  Last 3 Recorded Weights in this Encounter    09/11/19 1352   Weight: 55.3 kg (122 lb)      Ht Readings from Last 1 Encounters:   09/11/19 5' 3\" (1.6 m)     Weight Metrics 9/11/2019   Weight 122 lb   BMI 21.61 kg/m2       No data found. IBW: 115 lb %IBW: 106% UBW: 122 lb   [] Weight Loss [] Weight Gain [x] Weight Stable    Estimated Nutrition Needs: [x] MSJ x 1.3 [x] Other: 25 kcal/kg  Calories: 4824-3246 kcal Based on:   [x] Actual BW    Protein:   60-67 g Based on:   [x] Actual BW    Fluid:       1500 ml Based on:   [x] Actual BW      [x] No Cultural, Sabianist or ethnic dietary need identified.     [] Cultural, Sabianist and ethnic food preferences identified and addressed     Wt Status:  [x] Normal (18.6 - 24.9) [] Underweight (<18.5) [] Overweight (25 - 29.9) [] Mild Obesity (30 - 34.9)  [] Moderate Obesity (35 - 39.9) [] Morbid Obesity (40+)       Nutrition Problems Identified:   [x] Suboptimal PO intake   [] Food Allergies  [] Difficulty chewing/swallowing/poor dentition  [] Constipation/Diarrhea   [] Nausea/Vomiting   [] None  [] Other:     Plan:   [x] Therapeutic Diet  [x]  Obtained/adjusted food preferences/tolerances and/or snacks options   []  Supplements added   [] Occupational therapy following for feeding techniques  []  HS snack added   []  Modify diet texture   []  Modify diet for food allergies   []  Educate patient   []  Assist with menu selection   [x]  Monitor PO intake on meal rounds   [x]  Continue inpatient monitoring and intervention   [x]  Participated in discharge planning/Interdisciplinary rounds/Team meetings   []  Other:     Education Needs:   [] Not appropriate for teaching at this time due to:   [x] Identified and addressed    Nutrition Monitoring and Evaluation:  [x] Continue ongoing monitoring and intervention  [] Nena Copeland

## 2019-09-12 NOTE — CONSULTS
Gastroenterology Consult    Patient: Lilibeth Manley MRN: [id number]  SSN: [id number]    YOB: 1935  Age: 81 y.o. Sex: female        Assessment:   1. Abdominal pain:  Mrs. Wilda Luna is an 81 yr old female with abdominal pain in right abdomen for a week and now associated diarrhea but no blood, fever or vomiting. CT noted inflammatory changes of the cecum with some appendiceal dilation. She reports having a negative colonoscopy in past few years. This is most likely either infectious process or ischemic. Unlikely malignancy or inflammatory bowel disease. She is currently refusing colonoscopy. She seems to be improving. I recommend continue supportive care. I recommend continue with antibiotics. Will recommend stool studies. Advance diet as tolerated to low residue diet. Plan:   1. Continue supportive care. 2. Continue antibiotics. 3. Stool studies. 4. Advance diet as tolerated. Subjective:      Lilibeth Manley is a 81 y.o. female who is being seen for abdominal pain. Mrs Wilda Luna has been having symptoms for the past week. She reports that she was initially have constipation. She had taken miralax and stool softeners. She then began to have diarrhea about half way through the week. She had loose to watery stools. She was also having abdominal pain. The pain was in the right abdomen with no radiation  The pain was dull and cramping with times of sharpness. She had no other abdominal pain. She had no pain prior to this week. She has no nausea or vomiting. No blood with stool and no melena. No weight loss. No fevers or chills. No heartburn or dysphagia. No recent travel or antibiotics. No sick contacts. She did report having lower blood pressure about 2-3 weeks ago and decreased her norvasc and eventually stopped it. She had no light headedness or syncope. She reports eating a few different items that had mold growing on it, so she was unsure of it safety.   She has not had similar symptoms in the past.  She reports having a colonoscopy by Dr. Samuel Loo in the past few years that she was told only had diverticulosis. She refuses to have any further colonoscopy. She had normal labs. She had CT scan with inflammatory changes in the area of the cecum, dilated appendix and diverticulosis. She has not had any further diarrhea and noted decrease in pain since admission. No other complaints. .    Past Medical History  HTN  CAD    Past Surgical History  CLAUDIA  Cholecystectomy  Colonoscopy  Bladder lift    Family History  No family history of any chronic GI illness    Social History  No alcohol or drug use.       Medications  Current Facility-Administered Medications   Medication Dose Route Frequency    potassium chloride SR (KLOR-CON 10) tablet 20 mEq  20 mEq Oral Q2H    aztreonam (AZACTAM) 2 g in 0.9% sodium chloride (MBP/ADV) 100 mL MBP  2 g IntraVENous Q8H    clindamycin (CLEOCIN) 900mg D5W 50mL IVPB (premix)  900 mg IntraVENous Q8H    acetaminophen (TYLENOL) tablet 650 mg  650 mg Oral Q4H PRN    heparin (porcine) injection 5,000 Units  5,000 Units SubCUTAneous Q8H    morphine injection 2 mg  2 mg IntraVENous Q4H PRN    ondansetron (ZOFRAN) injection 4 mg  4 mg IntraVENous Q4H PRN    0.9% sodium chloride infusion  100 mL/hr IntraVENous CONTINUOUS        Hospital Problems  Never Reviewed          Codes Class Noted POA    * (Principal) RLQ abdominal pain ICD-10-CM: R10.31  ICD-9-CM: 789.03  9/11/2019 Unknown        Hyponatremia ICD-10-CM: E87.1  ICD-9-CM: 276.1  9/11/2019 Unknown        Essential hypertension ICD-10-CM: I10  ICD-9-CM: 401.9  9/11/2019 Unknown        Environmental allergies ICD-10-CM: Z91.09  ICD-9-CM: V15.09  9/11/2019 Unknown        Acute appendicitis ICD-10-CM: K35.80  ICD-9-CM: 540.9  9/11/2019 Unknown            Allergies   Allergen Reactions    Pcn [Penicillins] Anaphylaxis    Levaquin [Levofloxacin] Unknown (comments)     \"arthritic pain'    Sulfa (Sulfonamide Antibiotics) Unknown (comments)       Review of Systems:  A comprehensive review of systems was negative except for that written in the History of Present Illness. Objective:     Physical Exam:  Visit Vitals  /67 (BP 1 Location: Right leg, BP Patient Position: At rest)   Pulse 75   Temp 97.4 °F (36.3 °C)   Resp 18   Ht 5' 3\" (1.6 m)   Wt 55.3 kg (122 lb)   SpO2 96%   BMI 21.61 kg/m²     General appearance: alert, cooperative, no distress, appears stated age  Lungs: clear to auscultation bilaterally  Heart: regular rate and rhythm, S1, S2 normal, no murmur, click, rub or gallop  Abdomen: soft, mild tenderness in right abdomen. Bowel sounds normal. No masses,  no organomegaly    Recent Results (from the past 24 hour(s))   EKG, 12 LEAD, INITIAL    Collection Time: 09/11/19  2:00 PM   Result Value Ref Range    Ventricular Rate 79 BPM    Atrial Rate 79 BPM    P-R Interval 312 ms    QRS Duration 170 ms    Q-T Interval 438 ms    QTC Calculation (Bezet) 502 ms    Calculated R Axis -18 degrees    Calculated T Axis 66 degrees    Diagnosis       Electronic ventricular pacemaker  When compared with ECG of 14-MAY-2012 13:15,  Electronic ventricular pacemaker has replaced Sinus rhythm  Confirmed by Jona Braswell ([id number]) on 9/11/2019 3:19:28 PM     CBC WITH AUTOMATED DIFF    Collection Time: 09/11/19  3:00 PM   Result Value Ref Range    WBC 8.8 4.6 - 13.2 K/uL    RBC 3.48 (L) 4.20 - 5.30 M/uL    HGB 11.1 (L) 12.0 - 16.0 g/dL    HCT 31.8 (L) 35.0 - 45.0 %    MCV 91.4 74.0 - 97.0 FL    MCH 31.9 24.0 - 34.0 PG    MCHC 34.9 31.0 - 37.0 g/dL    RDW 13.0 11.6 - 14.5 %    PLATELET 692 185 - 719 K/uL    MPV 10.2 9.2 - 11.8 FL    NEUTROPHILS 82 (H) 40 - 73 %    LYMPHOCYTES 10 (L) 21 - 52 %    MONOCYTES 8 3 - 10 %    EOSINOPHILS 0 0 - 5 %    BASOPHILS 0 0 - 2 %    ABS. NEUTROPHILS 7.2 1.8 - 8.0 K/UL    ABS. LYMPHOCYTES 0.8 (L) 0.9 - 3.6 K/UL    ABS. MONOCYTES 0.7 0.05 - 1.2 K/UL    ABS.  EOSINOPHILS 0.0 0.0 - 0.4 K/UL ABS. BASOPHILS 0.0 0.0 - 0.1 K/UL    DF AUTOMATED     LIPASE    Collection Time: 09/11/19  3:00 PM   Result Value Ref Range    Lipase 74 73 - 393 U/L   CARDIAC PANEL,(CK, CKMB & TROPONIN)    Collection Time: 09/11/19  3:00 PM   Result Value Ref Range    CK 38 26 - 192 U/L    CK - MB <1.0 <3.6 ng/ml    CK-MB Index  0.0 - 4.0 %     CALCULATION NOT PERFORMED WHEN RESULT IS BELOW LINEAR LIMIT    Troponin-I, QT <0.02 0.0 - 4.626 NG/ML   METABOLIC PANEL, COMPREHENSIVE    Collection Time: 09/11/19  3:00 PM   Result Value Ref Range    Sodium 127 (L) 136 - 145 mmol/L    Potassium 3.6 3.5 - 5.5 mmol/L    Chloride 93 (L) 100 - 111 mmol/L    CO2 25 21 - 32 mmol/L    Anion gap 9 3.0 - 18 mmol/L    Glucose 109 (H) 74 - 99 mg/dL    BUN 8 7.0 - 18 MG/DL    Creatinine 0.82 0.6 - 1.3 MG/DL    BUN/Creatinine ratio 10 (L) 12 - 20      GFR est AA >60 >60 ml/min/1.73m2    GFR est non-AA >60 >60 ml/min/1.73m2    Calcium 8.7 8.5 - 10.1 MG/DL    Bilirubin, total 1.0 0.2 - 1.0 MG/DL    ALT (SGPT) 21 13 - 56 U/L    AST (SGOT) 12 10 - 38 U/L    Alk.  phosphatase 111 45 - 117 U/L    Protein, total 6.7 6.4 - 8.2 g/dL    Albumin 3.0 (L) 3.4 - 5.0 g/dL    Globulin 3.7 2.0 - 4.0 g/dL    A-G Ratio 0.8 0.8 - 1.7     URINALYSIS W/ RFLX MICROSCOPIC    Collection Time: 09/11/19  3:29 PM   Result Value Ref Range    Color DARK YELLOW      Appearance CLEAR      Specific gravity 1.013 1.005 - 1.030      pH (UA) 6.0 5.0 - 8.0      Protein TRACE (A) NEG mg/dL    Glucose NEGATIVE  NEG mg/dL    Ketone TRACE (A) NEG mg/dL    Bilirubin NEGATIVE  NEG      Blood NEGATIVE  NEG      Urobilinogen 2.0 (H) 0.2 - 1.0 EU/dL    Nitrites NEGATIVE  NEG      Leukocyte Esterase NEGATIVE  NEG     URINE MICROSCOPIC ONLY    Collection Time: 09/11/19  3:29 PM   Result Value Ref Range    WBC 0 to 2 0 - 4 /hpf    RBC 0 0 - 5 /hpf    Epithelial cells FEW 0 - 5 /lpf    Bacteria NEGATIVE  NEG /hpf   METABOLIC PANEL, BASIC    Collection Time: 09/12/19  4:00 AM   Result Value Ref Range Sodium 131 (L) 136 - 145 mmol/L    Potassium 3.4 (L) 3.5 - 5.5 mmol/L    Chloride 99 (L) 100 - 111 mmol/L    CO2 22 21 - 32 mmol/L    Anion gap 10 3.0 - 18 mmol/L    Glucose 101 (H) 74 - 99 mg/dL    BUN 7 7.0 - 18 MG/DL    Creatinine 0.65 0.6 - 1.3 MG/DL    BUN/Creatinine ratio 11 (L) 12 - 20      GFR est AA >60 >60 ml/min/1.73m2    GFR est non-AA >60 >60 ml/min/1.73m2    Calcium 8.0 (L) 8.5 - 10.1 MG/DL   MAGNESIUM    Collection Time: 09/12/19  4:00 AM   Result Value Ref Range    Magnesium 1.8 1.6 - 2.6 mg/dL   PHOSPHORUS    Collection Time: 09/12/19  4:00 AM   Result Value Ref Range    Phosphorus 3.7 2.5 - 4.9 MG/DL   CBC WITH AUTOMATED DIFF    Collection Time: 09/12/19  4:00 AM   Result Value Ref Range    WBC 6.6 4.6 - 13.2 K/uL    RBC 3.10 (L) 4.20 - 5.30 M/uL    HGB 9.6 (L) 12.0 - 16.0 g/dL    HCT 28.8 (L) 35.0 - 45.0 %    MCV 92.9 74.0 - 97.0 FL    MCH 31.0 24.0 - 34.0 PG    MCHC 33.3 31.0 - 37.0 g/dL    RDW 13.1 11.6 - 14.5 %    PLATELET 560 661 - 473 K/uL    MPV 10.0 9.2 - 11.8 FL    NEUTROPHILS 75 (H) 40 - 73 %    LYMPHOCYTES 14 (L) 21 - 52 %    MONOCYTES 10 3 - 10 %    EOSINOPHILS 1 0 - 5 %    BASOPHILS 0 0 - 2 %    ABS. NEUTROPHILS 4.9 1.8 - 8.0 K/UL    ABS. LYMPHOCYTES 0.9 0.9 - 3.6 K/UL    ABS. MONOCYTES 0.7 0.05 - 1.2 K/UL    ABS. EOSINOPHILS 0.1 0.0 - 0.4 K/UL    ABS.  BASOPHILS 0.0 0.0 - 0.1 K/UL    DF AUTOMATED           Signed By: Donnie Jaffe MD     September 12, 2019

## 2019-09-12 NOTE — CONSULTS
See Dictated Consult    Inflammatory changes involving cecum/small bowel with dilated appendix with minimal associated inflammatory changes of unknown etiology - doubt acute appendicitis  Agree with current tx, would advance diet   Anticipate resolution of sx with medical tx

## 2019-09-12 NOTE — PROGRESS NOTES
Problem: Discharge Planning  Goal: *Discharge to safe environment  Outcome: Resolved/Met   Plan home    Reason for Admission:   abd pain                   RRAT Score:     8                Plan for utilizing home health:    no                      Current Advanced Directive/Advance Care Plan: none                         Transition of Care Plan:   Spoke with pt, lives with spouse. Independent with adls. amb with walker or cane, uses both. Lives in 2 story home. Spouse designated for dcp, transport home. Demographics correct. States has [facility] aarp supplement to [facility]. pcp Dr Tori Hammer. Plan home. Patient has designated _____spouse___________________ to participate in his/her discharge plan and to receive any needed information. Name: lane spaulding   Address:  Phone number: [phone]    Care Management Interventions  PCP Verified by CM: Yes  Palliative Care Criteria Met (RRAT>21 & CHF Dx)?: No  Mode of Transport at Discharge:  Other (see comment)  Transition of Care Consult (CM Consult): Discharge Planning  Discharge Durable Medical Equipment: No  Physical Therapy Consult: No  Occupational Therapy Consult: No  Speech Therapy Consult: No  Current Support Network: Lives with Spouse  Confirm Follow Up Transport: Family  Plan discussed with Pt/Family/Caregiver: Yes  Discharge Location  Discharge Placement: Home

## 2019-09-12 NOTE — CONSULTS
[address]    Name:  Santi Clifton  MR#:   [id number]  :  1935  ACCOUNT #:  [de-identified]  DATE OF SERVICE:  2019    REASON FOR CONSULTATION:  Right lower quadrant abdominal pain. HISTORY OF PRESENT ILLNESS:  This is an 43-year-old white female who has a history of chronic constipation who 3 days ago had the onset of constipation for which she utilized prune juice. The patient notes at the same time she had the onset of achy right lower quadrant abdominal pain. The prune juice induced diarrhea with resolution of her complaint of chronic constipation but with persistent right lower quadrant pain for which she presented to Emergency Department at [facility] on 09/10/2019 for evaluation and after evaluation was released. Because of persistent abdominal pain, she re-presented to [facility] Emergency Department on 2019 and diagnostic evaluation was pursued which included abdominopelvic CT scan noting the presence of inflammatory changes involving the pericecal area and wall thickening of the distal ileum as well as what appears to be dilated appendix 11 mm without definitive inflammatory changes. There is, however, adjacent to the tip of the presumed dilated appendix a rounded structure measuring 2.6 cm with central calcification. Surgical consultation is obtained for opinion in regard to management. The patient denies previous similar symptoms, fevers, chills, persistent urinary symptomatology. PAST MEDICAL HISTORY:  1. History of coronary artery disease, status post pacemaker placement. 2.  History of diverticulosis. PREADMISSION MEDICATIONS:  1. Amlodipine 2.5 mg daily. 2.  Singulair in the past, not currently prescribed. ALLERGIES:  PENICILLIN, LEVAQUIN, SULFA. PAST SURGICAL HISTORY:  1.   Vaginal hysterectomy, .  2.  Open reduction and internal fixation of left ankle fracture in .  3.  Bladder suspension in 2013.  4.  Laparoscopic cholecystectomy in . SOCIAL HISTORY:  The patient denies utilization of both tobacco and alcohol. FAMILY HISTORY:  Father  in the 46s of hepatitis. Mother  at 80 of thyroid cancer. She has no siblings. REVIEW OF SYSTEMS:  GENERAL:  The patient denies recent changes in her sleep or dietary patterns except as noted. HEENT:  Denies changes in auditory or visual acuity, recurrent pharyngitis, chronic rhinorrhea, epistaxis. RESPIRATORY:  Denies increased shortness of breath, productive cough, hemoptysis. CARDIOVASCULAR:  He has history of coronary artery disease, status post pacemaker placement. Denies hypertension, heart murmur, chest pain. GASTROINTESTINAL:  As noted in the history of chief complaint. GENITOURINARY:  Denies frequency, urgency, hesitancy, or dysuria. MUSCULOSKELETAL:  Has a history of a fractured left ankle status post ORIF and diffuse arthralgias secondary to osteoarthritis. NEUROLOGIC:  Denies history of stroke, paralysis, and paresthesias. PHYSICAL EXAMINATION:  GENERAL:  The patient is an alert white female, in no acute distress. She is oriented x3, conversant, nontoxic in appearance. VITAL SIGNS:  The patient is 5 feet 3 inches in height, weighing 122 pounds. Temperature is 97.3, pulse 73, respiratory rate 18, blood pressure is 119/69, room air oxygen saturation is 97%. HEENT:  Unremarkable. LUNGS:  Clear to auscultation without adventitious sounds with equal and symmetric excursions bilaterally. CARDIAC:  Reveals a regular rate and rhythm without rub, gallop, or murmur. SPINE:  Reveals normal kyphoscoliotic curve without CVA tenderness. ABDOMEN:  Minimally protuberant with normoactive bowel sounds and no masses or megaly. The patient's abdomen is extremely soft with the exception of minimal, very deep tenderness in the right lower quadrant without peritoneal signs. BREASTS:  Not performed. PELVIC:  Not performed.   RECTAL: Not performed. EXTREMITIES:  Have full range of motion without deformity. Pulses are 4/4 and symmetric in dorsalis pedis, posterior tibial, femoral, radial, brachial, and quad distributions. NEUROLOGIC:  Cranial nerves II through XII are intact. Motor is 5/5 in all major muscle groups of the upper and lower extremities in symmetric fashion. Sensory exam is intact to light touch, pinprick, pain, and proprioception. Deep tendon reflexes are +2/4 and symmetric in the upper and lower extremities. LABORATORY DATA:  Laboratory profile included a CBC on 09/12/2019 with hematocrit of 28.8, hemoglobin 9.6, white blood cell count 6.6 with a slight left shift with 75 segmented neutrophils. BMP obtained also on 09/12/2019 notes a sodium of 131, potassium of 3.4, chloride of 99, glucose of 101, calcium 8, otherwise normal laboratory parameters. Abdominal and pelvic CT scan is obtained at the time of admission. Notable findings of diverticulosis. In addition, the patient has moderate pericecal inflammatory changes as well as distal ileal inflammatory changes with thickening of the wall and splaying of the terminal ileum with a dilated tubular structure 11 mm in diameter thought to represent dilated appendix, and at the tip of the presumed dilated appendix was a rounded structure measuring 2.6 cm with central calcifications with the right ovarian vein extending inferiorly to the surrounding structure felt to represent the right ovary  ASSESSMENT:  1. Pericecal inflammatory changes as well as thickened ileum in conjunction with a dilated appendix as well as a calcified right ovary. The patient does not appear to have acute appendicitis and further evaluation of the inflammatory changes should be pursued at the radiology, is not clear at the present time. 2.  Coronary artery disease, status post pacemaker placement. 3.  Diverticulosis. 4.  Anemia with hematocrit of 29.     PLAN:  The patient has been admitted to the hospitalist service, currently is n.p.o., on IV antibiotics. I feel the patient could likely be advanced on her diet and be covered on oral antibiotic therapy. She should be followed with serial clinical and laboratory evaluation with anticipated improvement in her clinical presentation. Gastroenterologic consultation could certainly be entertained to assist in delineation of etiology of the radiologic changes. We will be happy to follow and appreciate the consult.       Arturo Escobar DO      [id number]  D:  09/12/2019 9:14  T:  09/12/2019 11:36  JOB #:  [id number]

## 2019-09-12 NOTE — PROGRESS NOTES
Progress Note      Patient: Alexandre Olvera               Sex: female          DOA: 9/11/2019       YOB: 1935      Age:  81 y.o.        LOS:  LOS: 1 day             CHIEF COMPLAINT:  Abdominal Pain      Assessment/Plan:     Principal Problem:    RLQ abdominal pain (9/11/2019)    Active Problems:    Hyponatremia (9/11/2019)      Essential hypertension (9/11/2019)      Environmental allergies (9/11/2019)      Acute appendicitis (9/11/2019)        PLAN:  · RLQ abd pain   ? D/w Dr. Vidhi Bell who does not believe pt has acute appendicitis but may have colitis or ileitis, recommending GI consult, cont atbx and advance diet. ? GI consulted. ? Cont IV abx to target bowel mireille, aztreonam and clindamycin (due to allergies)  ? IV fluids  ? Advance diet  ? Pain meds and anti-emetics prn  · HTN  ? BP control  · Hyponatremia  ? Suspect due to decreased PO intake over last 3-4 days  ? IV fluids  ? Monitor Na, improving  · Hypokalemia  ? Replace K  · DVT prophylaxis      Subjective:     Pt said her pain is about the same, slightly better. She said she wants to eat. Objective:     Visit Vitals  /78 (BP 1 Location: Left arm, BP Patient Position: At rest)   Pulse 76   Temp 97.9 °F (36.6 °C)   Resp 18   Ht 5' 3\" (1.6 m)   Wt 55.3 kg (122 lb)   SpO2 96%   BMI 21.61 kg/m²        Physical Exam:  Ears: hearing is intact  Eyes:  Icterus is not present  Lungs: clear to auscultation bilaterally  Heart: regular rate and rhythm, S1, S2 normal, no murmur, click, rub or gallop  Gastrointestinal: mod tenderness in RLQ, no r/g  Neurological:  New Focal Deficits are not present  Psychiatric:  Mood is stable    Lab/Data Reviewed:  CMP:   Lab Results   Component Value Date/Time     (L) 09/12/2019 04:00 AM    K 3.4 (L) 09/12/2019 04:00 AM    CL 99 (L) 09/12/2019 04:00 AM    CO2 22 09/12/2019 04:00 AM    AGAP 10 09/12/2019 04:00 AM     (H) 09/12/2019 04:00 AM    BUN 7 09/12/2019 04:00 AM    CREA 0.65 09/12/2019 04:00 AM    GFRAA >60 09/12/2019 04:00 AM    GFRNA >60 09/12/2019 04:00 AM    CA 8.0 (L) 09/12/2019 04:00 AM    MG 1.8 09/12/2019 04:00 AM    PHOS 3.7 09/12/2019 04:00 AM    ALB 3.0 (L) 09/11/2019 03:00 PM    TP 6.7 09/11/2019 03:00 PM    GLOB 3.7 09/11/2019 03:00 PM    AGRAT 0.8 09/11/2019 03:00 PM    SGOT 12 09/11/2019 03:00 PM    ALT 21 09/11/2019 03:00 PM     CBC:   Lab Results   Component Value Date/Time    WBC 6.6 09/12/2019 04:00 AM    HGB 9.6 (L) 09/12/2019 04:00 AM    HCT 28.8 (L) 09/12/2019 04:00 AM     09/12/2019 04:00 AM       Imaging Reviewed:  Ct Abd Pelv Wo Cont    Result Date: 9/11/2019  EXAM: CT of the abdomen and pelvis CLINICAL INDICATION/HISTORY: Abdominal pain, RLQ   > Additional: None. COMPARISON: None. > Reference Exam: None. TECHNIQUE: Axial CT imaging of the abdomen and pelvis was performed without intravenous contrast. Oral contrast not administered. Multiplanar reformats were generated. Dose reduction techniques used: automated exposure control, adjustment of the mAs and/or kVp according to patient size, and iterative reconstruction techniques. Digital Imaging and Communications in Medicine (DICOM) format image data are available to nonaffiliated external healthcare facilities or entities on a secure, media free, reciprocally searchable basis with patient authorization for at least a 12-month period after this study. _______________ FINDINGS: LOWER CHEST: Bibasilar opacities, atelectasis or scarring. No significant pleural effusion. Heart is mildly enlarged. LIVER, BILIARY: Liver is unremarkable. Mild intra and extra hepatic biliary ductal dilatation, likely reservoir effect. Cholecystectomy. PANCREAS: Unremarkable. SPLEEN: Unremarkable. ADRENALS: Mildly diffusely prominent adrenal glands, may represent hyperplasia. KIDNEYS/URETERS/BLADDER: No hydronephrosis. No calculi. LYMPH NODES: No enlarged lymph nodes.  GASTROINTESTINAL TRACT: Moderate pericecal inflammatory changes with dilated tubular structure (measuring 11 mm in diameter thought to represent a dilated appendix. There is adjacent wall thickening involving the distal ileum with sparing of the terminal ileum, thought to be secondarily involved. Adjacent to the tip of the presumed dilated appendix there is a rounded structure (image 117) measuring 2.6 cm with central calcification. The right ovarian vein appears to extend inferiorly to this rounded structure. No extraluminal air. There is additional moderate to marked inflammatory changes within the lower mesentery. Moderate colonic diverticulosis without acute diverticulitis. PELVIC ORGANS: Hysterectomy. VASCULATURE: Mild atherosclerosis. BONES: No acute or aggressive osseous abnormalities identified. OTHER: No ascites. _______________     IMPRESSION: 1. Examination limited secondary to lack of oral and intravenous contrast. 2. Moderate to marked inflammatory changes in the pericecal region as well as within the lower pelvic mesentery. There appears to be a dilated tubular structure in the pericecal region thought to represent a dilated appendix, concerning for acute appendicitis. Adjacent to the presumed dilated appendix there is a rounded structure with central calcification, thought to represent the right ovary, less likely a periappendiceal abscess. No extraluminal air. Additional wall thickening of the distal ileum (with sparing of the terminal ileum) thought to be secondarily involved. 3. Moderate colonic diverticulosis without acute diverticulitis.

## 2019-09-13 VITALS
SYSTOLIC BLOOD PRESSURE: 155 MMHG | OXYGEN SATURATION: 97 % | DIASTOLIC BLOOD PRESSURE: 81 MMHG | HEIGHT: 63 IN | HEART RATE: 85 BPM | WEIGHT: 122 LBS | TEMPERATURE: 97.7 F | RESPIRATION RATE: 18 BRPM | BODY MASS INDEX: 21.62 KG/M2

## 2019-09-13 LAB
ANION GAP SERPL CALC-SCNC: 11 MMOL/L (ref 3–18)
BASOPHILS # BLD: 0 K/UL (ref 0–0.1)
BASOPHILS NFR BLD: 0 % (ref 0–2)
BUN SERPL-MCNC: 13 MG/DL (ref 7–18)
BUN/CREAT SERPL: 20 (ref 12–20)
CALCIUM SERPL-MCNC: 8 MG/DL (ref 8.5–10.1)
CHLORIDE SERPL-SCNC: 103 MMOL/L (ref 100–111)
CO2 SERPL-SCNC: 21 MMOL/L (ref 21–32)
CREAT SERPL-MCNC: 0.64 MG/DL (ref 0.6–1.3)
DIFFERENTIAL METHOD BLD: ABNORMAL
EOSINOPHIL # BLD: 0.1 K/UL (ref 0–0.4)
EOSINOPHIL NFR BLD: 1 % (ref 0–5)
ERYTHROCYTE [DISTWIDTH] IN BLOOD BY AUTOMATED COUNT: 13.4 % (ref 11.6–14.5)
GLUCOSE SERPL-MCNC: 98 MG/DL (ref 74–99)
HCT VFR BLD AUTO: 28.5 % (ref 35–45)
HGB BLD-MCNC: 9.9 G/DL (ref 12–16)
LYMPHOCYTES # BLD: 0.9 K/UL (ref 0.9–3.6)
LYMPHOCYTES NFR BLD: 14 % (ref 21–52)
MAGNESIUM SERPL-MCNC: 1.9 MG/DL (ref 1.6–2.6)
MCH RBC QN AUTO: 31.7 PG (ref 24–34)
MCHC RBC AUTO-ENTMCNC: 34.7 G/DL (ref 31–37)
MCV RBC AUTO: 91.3 FL (ref 74–97)
MONOCYTES # BLD: 0.6 K/UL (ref 0.05–1.2)
MONOCYTES NFR BLD: 10 % (ref 3–10)
NEUTS SEG # BLD: 4.6 K/UL (ref 1.8–8)
NEUTS SEG NFR BLD: 75 % (ref 40–73)
PHOSPHATE SERPL-MCNC: 2.5 MG/DL (ref 2.5–4.9)
PLATELET # BLD AUTO: 228 K/UL (ref 135–420)
PMV BLD AUTO: 10.1 FL (ref 9.2–11.8)
POTASSIUM SERPL-SCNC: 4.3 MMOL/L (ref 3.5–5.5)
RBC # BLD AUTO: 3.12 M/UL (ref 4.2–5.3)
SODIUM SERPL-SCNC: 135 MMOL/L (ref 136–145)
WBC # BLD AUTO: 6.2 K/UL (ref 4.6–13.2)

## 2019-09-13 PROCEDURE — 36415 COLL VENOUS BLD VENIPUNCTURE: CPT

## 2019-09-13 PROCEDURE — 84100 ASSAY OF PHOSPHORUS: CPT

## 2019-09-13 PROCEDURE — 80048 BASIC METABOLIC PNL TOTAL CA: CPT

## 2019-09-13 PROCEDURE — 83735 ASSAY OF MAGNESIUM: CPT

## 2019-09-13 PROCEDURE — 85025 COMPLETE CBC W/AUTO DIFF WBC: CPT

## 2019-09-13 PROCEDURE — 74011000258 HC RX REV CODE- 258: Performed by: HOSPITALIST

## 2019-09-13 PROCEDURE — 74011250636 HC RX REV CODE- 250/636: Performed by: HOSPITALIST

## 2019-09-13 RX ORDER — MOXIFLOXACIN HYDROCHLORIDE 400 MG/1
400 TABLET ORAL DAILY
Qty: 5 TAB | Refills: 0 | Status: SHIPPED | OUTPATIENT
Start: 2019-09-13

## 2019-09-13 RX ADMIN — SODIUM CHLORIDE 100 ML/HR: 900 INJECTION, SOLUTION INTRAVENOUS at 01:54

## 2019-09-13 RX ADMIN — AZTREONAM 2 G: 2 INJECTION, POWDER, LYOPHILIZED, FOR SOLUTION INTRAMUSCULAR; INTRAVENOUS at 05:39

## 2019-09-13 RX ADMIN — CLINDAMYCIN PHOSPHATE 900 MG: 900 INJECTION, SOLUTION INTRAVENOUS at 01:50

## 2019-09-13 NOTE — PROGRESS NOTES
Problem: Discharge Planning  Goal: *Discharge to safe environment  Outcome: Resolved/Met   Plan home    Pt dc'd home, no services ordered. Care Management Interventions  PCP Verified by CM: Yes  Palliative Care Criteria Met (RRAT>21 & CHF Dx)?: No  Mode of Transport at Discharge:  Other (see comment)  Transition of Care Consult (CM Consult): Discharge Planning  Discharge Durable Medical Equipment: No  Physical Therapy Consult: No  Occupational Therapy Consult: No  Speech Therapy Consult: No  Current Support Network: Lives with Spouse  Confirm Follow Up Transport: Family  Plan discussed with Pt/Family/Caregiver: Yes  Discharge Location  Discharge Placement: Home

## 2019-09-13 NOTE — PROGRESS NOTES
Bedside shift change report given to [address] [place] [address] Avenue (oncoming nurse) by Jeremie Vuong (offgoing nurse). Report included the following information SBAR, Kardex, Intake/Output and MAR.

## 2019-09-13 NOTE — DISCHARGE INSTRUCTIONS

## 2019-09-13 NOTE — PROGRESS NOTES
Problem: Pain  Goal: *Control of Pain  Outcome: Progressing Towards Goal     Problem: Patient Education: Go to Patient Education Activity  Goal: Patient/Family Education  Outcome: Progressing Towards Goal     Problem: Pressure Injury - Risk of  Goal: *Prevention of pressure injury  Description  Document Emile Scale and appropriate interventions in the flowsheet. Outcome: Progressing Towards Goal  Note:   Pressure Injury Interventions: Activity Interventions: Increase time out of bed         Nutrition Interventions: Document food/fluid/supplement intake                     Problem: Patient Education: Go to Patient Education Activity  Goal: Patient/Family Education  Outcome: Progressing Towards Goal     Problem: Falls - Risk of  Goal: *Absence of Falls  Description  Document Denny Fells Fall Risk and appropriate interventions in the flowsheet.   Outcome: Progressing Towards Goal  Note:   Fall Risk Interventions:            Medication Interventions: Patient to call before getting OOB, Teach patient to arise slowly                   Problem: Patient Education: Go to Patient Education Activity  Goal: Patient/Family Education  Outcome: Progressing Towards Goal     Problem: Nutrition Deficit  Goal: *Optimize nutritional status  Outcome: Progressing Towards Goal

## 2019-09-13 NOTE — DISCHARGE SUMMARY
Discharge Summary    Patient: Ben Cruz               Sex: female          DOA: 9/11/2019       YOB: 1935      Age:  81 y.o.        LOS:  LOS: 2 days                Admit Date: 9/11/2019    Discharge Date: 9/13/2019    Admission Diagnoses: RLQ abdominal pain [R10.31]  RLQ abdominal pain [R10.31]  Acute appendicitis [K35.80]    Discharge Diagnoses:    Hospital Problems  Never Reviewed          Codes Class Noted POA    * (Principal) RLQ abdominal pain ICD-10-CM: R10.31  ICD-9-CM: 789.03  9/11/2019 Unknown        Hyponatremia ICD-10-CM: E87.1  ICD-9-CM: 276.1  9/11/2019 Unknown        Essential hypertension ICD-10-CM: I10  ICD-9-CM: 401.9  9/11/2019 Unknown        Environmental allergies ICD-10-CM: Z91.09  ICD-9-CM: V15.09  9/11/2019 Unknown              Discharge Disposition: Home or Self Care     Discharge Condition:  Improved    Hospital Course:  83F who presented with RLQ abdominal pain for 1 week associated with diarrhea, but no fever or vomiting. CT a/p showed inflammatory changes of the cecum with some appendiceal dilation. General Surgery was consulted, and stated there is no evidence of acute appendicitis, recommended IV antibiotics and to advance diet. She was started on IV aztreonam and clindamycin. GI was consulted and thought pt likely has infectious or ischemic process, agreed with antibiotics and supportive care. GI recommended colonoscopy but patient refused. With antibiotics and fluids pt improved significantly, and she tolerated a regular diet. RLQ abdominal pain almost fully resolved. She was given Rx for Moxifloxacin for 5 day course to finish treatment for cecal inflammation. She was advised to f/u with PCP next week to ensure RLQ abdominal pain is fully resolved. Of note, pt has multiple allergies to antibiotics including to PCN (anaphylaxis), sulfa and to cipro and levaquin with \"arthritic pain. \"  Appropriate antibiotic selection was discussed with PharmD, who due to multiple allergies, recommended moxifloxacin, as it is least likely to cause any muscle or joint pains compared to the other FQs, this was discussed with the patient and she agreed. Informed pt if she develops muscle or joint pain after starting Moxifloxacin to stop taking the medication, she expressed understanding. Consults:    Gastroenterology and General Surgery    Labs:  Labs: Results:       Chemistry Recent Labs     09/13/19 0100 09/12/19 0400 09/11/19  1500   GLU 98 101* 109*   * 131* 127*   K 4.3 3.4* 3.6    99* 93*   CO2 21 22 25   BUN 13 7 8   CREA 0.64 0.65 0.82   CA 8.0* 8.0* 8.7   AGAP 11 10 9   BUCR 20 11* 10*   AP  --   --  111   TP  --   --  6.7   ALB  --   --  3.0*   GLOB  --   --  3.7   AGRAT  --   --  0.8      CBC w/Diff Recent Labs     09/13/19 0100 09/12/19 0400 09/11/19  1500   WBC 6.2 6.6 8.8   RBC 3.12* 3.10* 3.48*   HGB 9.9* 9.6* 11.1*   HCT 28.5* 28.8* 31.8*    186 192   GRANS 75* 75* 82*   LYMPH 14* 14* 10*   EOS 1 1 0      Cardiac Enzymes Recent Labs     09/11/19  1500   CPK 38   CKND1 CALCULATION NOT PERFORMED WHEN RESULT IS BELOW LINEAR LIMIT      Coagulation No results for input(s): PTP, INR, APTT in the last 72 hours. No lab exists for component: INREXT    Lipid Panel No results found for: CHOL, CHOLPOCT, CHOLX, CHLST, CHOLV, 907437, HDL, HDLP, LDL, LDLC, DLDLP, 816072, VLDLC, VLDL, TGLX, TRIGL, TRIGP, TGLPOCT, CHHD, CHHDX   BNP No results for input(s): BNPP in the last 72 hours. Liver Enzymes Recent Labs     09/11/19  1500   TP 6.7   ALB 3.0*      SGOT 12      Thyroid Studies No results found for: T4, T3U, TSH, TSHEXT         Significant Diagnostic Studies:   Ct Abd Pelv Wo Cont    Result Date: 9/11/2019  EXAM: CT of the abdomen and pelvis CLINICAL INDICATION/HISTORY: Abdominal pain, RLQ   > Additional: None. COMPARISON: None. > Reference Exam: None.  TECHNIQUE: Axial CT imaging of the abdomen and pelvis was performed without intravenous contrast. Oral contrast not administered. Multiplanar reformats were generated. Dose reduction techniques used: automated exposure control, adjustment of the mAs and/or kVp according to patient size, and iterative reconstruction techniques. Digital Imaging and Communications in Medicine (DICOM) format image data are available to nonaffiliated external healthcare facilities or entities on a secure, media free, reciprocally searchable basis with patient authorization for at least a 12-month period after this study. _______________ FINDINGS: LOWER CHEST: Bibasilar opacities, atelectasis or scarring. No significant pleural effusion. Heart is mildly enlarged. LIVER, BILIARY: Liver is unremarkable. Mild intra and extra hepatic biliary ductal dilatation, likely reservoir effect. Cholecystectomy. PANCREAS: Unremarkable. SPLEEN: Unremarkable. ADRENALS: Mildly diffusely prominent adrenal glands, may represent hyperplasia. KIDNEYS/URETERS/BLADDER: No hydronephrosis. No calculi. LYMPH NODES: No enlarged lymph nodes. GASTROINTESTINAL TRACT: Moderate pericecal inflammatory changes with dilated tubular structure (measuring 11 mm in diameter thought to represent a dilated appendix. There is adjacent wall thickening involving the distal ileum with sparing of the terminal ileum, thought to be secondarily involved. Adjacent to the tip of the presumed dilated appendix there is a rounded structure (image 117) measuring 2.6 cm with central calcification. The right ovarian vein appears to extend inferiorly to this rounded structure. No extraluminal air. There is additional moderate to marked inflammatory changes within the lower mesentery. Moderate colonic diverticulosis without acute diverticulitis. PELVIC ORGANS: Hysterectomy. VASCULATURE: Mild atherosclerosis. BONES: No acute or aggressive osseous abnormalities identified. OTHER: No ascites. _______________     IMPRESSION: 1.   Examination limited secondary to lack of oral and intravenous contrast. 2. Moderate to marked inflammatory changes in the pericecal region as well as within the lower pelvic mesentery. There appears to be a dilated tubular structure in the pericecal region thought to represent a dilated appendix, concerning for acute appendicitis. Adjacent to the presumed dilated appendix there is a rounded structure with central calcification, thought to represent the right ovary, less likely a periappendiceal abscess. No extraluminal air. Additional wall thickening of the distal ileum (with sparing of the terminal ileum) thought to be secondarily involved. 3. Moderate colonic diverticulosis without acute diverticulitis. Discharge Medications:     Current Discharge Medication List      START taking these medications    Details   moxifloxacin (AVELOX) 400 mg tablet Take 1 Tab by mouth daily. Indications: Ileitis  Qty: 5 Tab, Refills: 0         CONTINUE these medications which have NOT CHANGED    Details   amLODIPine (NORVASC) 2.5 mg tablet Take 2.5 mg by mouth daily. montelukast sodium (SINGULAIR PO) Take  by mouth. Activity: Activity as tolerated    Diet: Cardiac Diet    Follow-up: PCP in 1 week.          Total time spent including time spent on final examination and discharge discussion, discharge documentation and records reviewed and medication reconciliation: > 30 minutes    La Guthrie MD  [facility]

## 2019-09-19 NOTE — CDMP QUERY
Patient admitted with abdominal pain, noted to have inflammation of the cecum. Based on the treatment provided, can you please specify if you were treating possible infectious colitis as a suspected: · Infectious bacterial colitis (no organism identified) · Infectious viral colitis · Both · Ischemic colitis · Other ____________ · Clinically indeterminable The medical record reflects the following: 
 
   Risk Factors: 82 yo female admitted with RLQ pain Clinical Indicators: Per discharge summary GI was consulted and thought pt likely has infectious or ischemic process Treatment: IV Aztreonam, clindamycin and discharged on Moxifloxacin, Cipro and Flagyl Thank you, 
Jamaal Dunne RN CDI   [phone]